# Patient Record
Sex: MALE | Race: WHITE | NOT HISPANIC OR LATINO | Employment: FULL TIME | URBAN - NONMETROPOLITAN AREA
[De-identification: names, ages, dates, MRNs, and addresses within clinical notes are randomized per-mention and may not be internally consistent; named-entity substitution may affect disease eponyms.]

---

## 2018-02-24 ENCOUNTER — DOCUMENTATION ONLY (OUTPATIENT)
Dept: FAMILY MEDICINE | Facility: OTHER | Age: 38
End: 2018-02-24

## 2018-02-24 RX ORDER — DIPHENOXYLATE HYDROCHLORIDE AND ATROPINE SULFATE 2.5; .025 MG/1; MG/1
1 TABLET ORAL DAILY
COMMUNITY

## 2019-02-26 ENCOUNTER — OFFICE VISIT (OUTPATIENT)
Dept: FAMILY MEDICINE | Facility: OTHER | Age: 39
End: 2019-02-26
Attending: NURSE PRACTITIONER
Payer: COMMERCIAL

## 2019-02-26 VITALS
DIASTOLIC BLOOD PRESSURE: 70 MMHG | RESPIRATION RATE: 18 BRPM | TEMPERATURE: 98.4 F | SYSTOLIC BLOOD PRESSURE: 120 MMHG | HEART RATE: 78 BPM | OXYGEN SATURATION: 96 % | WEIGHT: 157.5 LBS | HEIGHT: 66 IN | BODY MASS INDEX: 25.31 KG/M2

## 2019-02-26 DIAGNOSIS — J06.9 VIRAL URI WITH COUGH: Primary | ICD-10-CM

## 2019-02-26 PROCEDURE — 99213 OFFICE O/P EST LOW 20 MIN: CPT | Performed by: NURSE PRACTITIONER

## 2019-02-26 SDOH — HEALTH STABILITY: MENTAL HEALTH: HOW OFTEN DO YOU HAVE A DRINK CONTAINING ALCOHOL?: NEVER

## 2019-02-26 SDOH — HEALTH STABILITY: MENTAL HEALTH: HOW OFTEN DO YOU HAVE 6 OR MORE DRINKS ON ONE OCCASION?: LESS THAN MONTHLY

## 2019-02-26 ASSESSMENT — PAIN SCALES - GENERAL: PAINLEVEL: NO PAIN (0)

## 2019-02-26 ASSESSMENT — MIFFLIN-ST. JEOR: SCORE: 1573.2

## 2019-02-26 NOTE — PATIENT INSTRUCTIONS
Symptomatic treatments recommended.  -Discussed that antibiotics would not help symptoms of viral URI. Education provided on symptoms of secondary bacterial infection such as new fever, chills, rigors, shortness of breath, increased work of breathing, that can occur with viral URI and need for further evaluation, if they occur.   - Ensure you are staying hydrated by drinking plenty of fluids or eating foods such as popsicles, jello, pudding.  - Honey and Salt water gurgles can help soothe sore throat  - Rest  - Humidifier can help with congestion and help keep mucus membranes such as throat and nose from drying out.  - Sleeping slightly propped up can help with congestion and postnasal drainage that can worsen cough at bedtime.  - As long as you have never been told to take Tylenol and/or Ibuprofen you can use them to manage fever and body aches per package instructions  Make sure you eat when you take ibuprofen to avoid stomach upset.  - OTC cough medications per package instructions to help with cough. Check to see if the cough/cold medication already has acetaminophen (Tylenol) in it. If it does avoid taking additional Tylenol.  - If sudden onset of new fever, worsening symptoms return for further evaluation.  - OTC antihistamine such as Allegra, Zyrtec, Claritin (generic is okay) can help with nasal/sinus congestion and OTC nasal steroid such as Flonase can help decrease sinus inflammation to help with congestion.

## 2019-02-26 NOTE — NURSING NOTE
"Chief Complaint   Patient presents with     Flu Symptoms     body aches, weakness, fever, x 6 days, rash started this morning.     Started 6 days ago, fever has been down since the last 2 days.    Initial /70   Pulse 78   Temp 98.4  F (36.9  C) (Temporal)   Resp 18   Ht 1.67 m (5' 5.75\")   Wt 71.4 kg (157 lb 8 oz)   SpO2 96%   BMI 25.61 kg/m   Estimated body mass index is 25.61 kg/m  as calculated from the following:    Height as of this encounter: 1.67 m (5' 5.75\").    Weight as of this encounter: 71.4 kg (157 lb 8 oz).    Medication Reconciliation: complete      Norma J. Gosselin, LPN  "

## 2019-02-26 NOTE — PROGRESS NOTES
SUBJECTIVE:   Rufino Fragoso is a 38 year old male who presents to clinic today for the following health issues:    Acute Illness   Acute illness concerns: flu like symptoms  Onset: 6 days    Fever: YES- up to 102F, none for the last 2 days    Chills/Sweats: YES- initially, none for the last 2 days    Headache (location?): YES- frontal    Sinus Pressure:no    Conjunctivitis:  no    Ear Pain: no    Rhinorrhea: YES- mild    Congestion: no    Sore Throat: YES- scrathcy     Cough: YES-non-productive    Wheeze: no    Decreased Appetite: YES    Nausea: no    Vomiting: no    Diarrhea:  no    Dysuria/Freq.: no    Fatigue/Achiness: YES    Sick/Strep Exposure: no     Therapies Tried and outcome: Tylenol cold and flu extreme - slightly better    Problem list and histories reviewed & adjusted, as indicated.  Additional history: as documented    There is no problem list on file for this patient.    Past Surgical History:   Procedure Laterality Date     EXCISE MASS GROIN Left 2000       Social History     Tobacco Use     Smoking status: Never Smoker     Smokeless tobacco: Never Used   Substance Use Topics     Alcohol use: Yes     Frequency: Never     Binge frequency: Less than monthly     Comment: Alcoholic Drinks/day: rare     Family History   Problem Relation Age of Onset     Colorectal Cancer Mother      Hypertension Maternal Half-Brother      Hypertension Maternal Half-Brother          Current Outpatient Medications   Medication Sig Dispense Refill     Multiple Vitamin (MULTI-VITAMINS) TABS Take 1 tablet by mouth daily       Allergies   Allergen Reactions     Codeine Nausea and Vomiting     Hydromorphone Nausea and Vomiting       Reviewed and updated as needed this visit by clinical staff  Tobacco  Allergies  Meds  Problems  Med Hx  Surg Hx  Fam Hx  Soc Hx        Reviewed and updated as needed this visit by Provider  Tobacco  Allergies  Meds  Problems  Med Hx  Surg Hx  Fam Hx  Soc Hx          ROS:  As  "above    OBJECTIVE:     /70   Pulse 78   Temp 98.4  F (36.9  C) (Temporal)   Resp 18   Ht 1.67 m (5' 5.75\")   Wt 71.4 kg (157 lb 8 oz)   SpO2 96%   BMI 25.61 kg/m    Body mass index is 25.61 kg/m .  GENERAL: healthy, alert and no distress  EYES: Eyes grossly normal to inspection, PERRL and conjunctivae and sclerae normal  HENT: normal cephalic/atraumatic, ear canals and TM's normal, nose and mouth without ulcers or lesions, rhinorrhea clear, oral mucous membranes moist, sinuses: not tender and mild cobblestoning of posterior oropharynx  NECK: no adenopathy, no asymmetry, masses, or scars and thyroid normal to palpation  RESP: lungs clear to auscultation - no rales, rhonchi or wheezes  CV: regular rate and rhythm, normal S1 S2, no S3 or S4, no murmur, click or rub, no peripheral edema and peripheral pulses strong  MS: no gross musculoskeletal defects noted, no edema  SKIN: generalized pinpoint erythematous rash noted to both hands  NEURO: Normal strength and tone, mentation intact and speech normal  PSYCH: mentation appears normal, affect normal/bright    Diagnostic Test Results:  none     ASSESSMENT/PLAN:     1. Viral URI with cough  Symptomatic treatments recommended.  -Discussed that antibiotics would not help symptoms of viral URI. Education provided on symptoms of secondary bacterial infection such as new fever, chills, rigors, shortness of breath, increased work of breathing, that can occur with viral URI and need for further evaluation, if they occur.   - Ensure you are staying hydrated by drinking plenty of fluids or eating foods such as popsicles, jello, pudding.  - Honey and Salt water gurgles can help soothe sore throat  - Rest  - Humidifier can help with congestion and help keep mucus membranes such as throat and nose from drying out.  - Sleeping slightly propped up can help with congestion and postnasal drainage that can worsen cough at bedtime.  - As long as you have never been told to take " Tylenol and/or Ibuprofen you can use them to manage fever and body aches per package instructions  Make sure you eat when you take ibuprofen to avoid stomach upset.  - OTC cough medications per package instructions to help with cough. Check to see if the cough/cold medication already has acetaminophen (Tylenol) in it. If it does avoid taking additional Tylenol.  - If sudden onset of new fever, worsening symptoms return for further evaluation.  - OTC antihistamine such as Allegra, Zyrtec, Claritin (generic is okay) can help with nasal/sinus congestion and OTC nasal steroid such as Flonase can help decrease sinus inflammation to help with congestion.    Daksha Pedersen NP  [facility]

## 2023-03-14 ENCOUNTER — HOSPITAL ENCOUNTER (OUTPATIENT)
Dept: CARDIOLOGY | Facility: OTHER | Age: 43
Discharge: HOME | End: 2023-03-14
Attending: NURSE PRACTITIONER | Admitting: NURSE PRACTITIONER
Payer: COMMERCIAL

## 2023-03-14 DIAGNOSIS — R00.2 PALPITATIONS: ICD-10-CM

## 2023-03-14 LAB — LVEF ECHO: NORMAL

## 2023-03-14 PROCEDURE — 93306 TTE W/DOPPLER COMPLETE: CPT

## 2023-03-14 PROCEDURE — 93306 TTE W/DOPPLER COMPLETE: CPT | Mod: 26 | Performed by: STUDENT IN AN ORGANIZED HEALTH CARE EDUCATION/TRAINING PROGRAM

## 2023-11-01 ENCOUNTER — HOSPITAL ENCOUNTER (EMERGENCY)
Facility: OTHER | Age: 43
Discharge: HOME | End: 2023-11-01
Attending: EMERGENCY MEDICINE | Admitting: EMERGENCY MEDICINE
Payer: COMMERCIAL

## 2023-11-01 VITALS
TEMPERATURE: 97.7 F | OXYGEN SATURATION: 98 % | WEIGHT: 168 LBS | HEART RATE: 76 BPM | RESPIRATION RATE: 23 BRPM | SYSTOLIC BLOOD PRESSURE: 116 MMHG | HEIGHT: 68 IN | DIASTOLIC BLOOD PRESSURE: 77 MMHG | BODY MASS INDEX: 25.46 KG/M2

## 2023-11-01 DIAGNOSIS — R55 PRE-SYNCOPE: ICD-10-CM

## 2023-11-01 DIAGNOSIS — R42 DIZZINESS: ICD-10-CM

## 2023-11-01 LAB
ANION GAP SERPL CALCULATED.3IONS-SCNC: 10 MMOL/L (ref 7–15)
BASOPHILS # BLD AUTO: 0.1 10E3/UL (ref 0–0.2)
BASOPHILS NFR BLD AUTO: 1 %
BUN SERPL-MCNC: 20 MG/DL (ref 6–20)
CALCIUM SERPL-MCNC: 9.4 MG/DL (ref 8.6–10)
CHLORIDE SERPL-SCNC: 103 MMOL/L (ref 98–107)
CREAT SERPL-MCNC: 1.18 MG/DL (ref 0.67–1.17)
DEPRECATED HCO3 PLAS-SCNC: 27 MMOL/L (ref 22–29)
EGFRCR SERPLBLD CKD-EPI 2021: 79 ML/MIN/1.73M2
EOSINOPHIL # BLD AUTO: 0.2 10E3/UL (ref 0–0.7)
EOSINOPHIL NFR BLD AUTO: 2 %
ERYTHROCYTE [DISTWIDTH] IN BLOOD BY AUTOMATED COUNT: 11.9 % (ref 10–15)
FLUAV RNA SPEC QL NAA+PROBE: NEGATIVE
FLUBV RNA RESP QL NAA+PROBE: NEGATIVE
GLUCOSE SERPL-MCNC: 128 MG/DL (ref 70–99)
HCT VFR BLD AUTO: 45.2 % (ref 40–53)
HGB BLD-MCNC: 15.6 G/DL (ref 13.3–17.7)
HOLD SPECIMEN: NORMAL
IMM GRANULOCYTES # BLD: 0 10E3/UL
IMM GRANULOCYTES NFR BLD: 0 %
LACTATE SERPL-SCNC: 1.6 MMOL/L (ref 0.7–2)
LYMPHOCYTES # BLD AUTO: 2.5 10E3/UL (ref 0.8–5.3)
LYMPHOCYTES NFR BLD AUTO: 26 %
MAGNESIUM SERPL-MCNC: 2 MG/DL (ref 1.7–2.3)
MCH RBC QN AUTO: 30.4 PG (ref 26.5–33)
MCHC RBC AUTO-ENTMCNC: 34.5 G/DL (ref 31.5–36.5)
MCV RBC AUTO: 88 FL (ref 78–100)
MONOCYTES # BLD AUTO: 0.9 10E3/UL (ref 0–1.3)
MONOCYTES NFR BLD AUTO: 9 %
NEUTROPHILS # BLD AUTO: 6 10E3/UL (ref 1.6–8.3)
NEUTROPHILS NFR BLD AUTO: 62 %
NRBC # BLD AUTO: 0 10E3/UL
NRBC BLD AUTO-RTO: 0 /100
PLATELET # BLD AUTO: 220 10E3/UL (ref 150–450)
POTASSIUM SERPL-SCNC: 4 MMOL/L (ref 3.4–5.3)
RBC # BLD AUTO: 5.13 10E6/UL (ref 4.4–5.9)
RSV RNA SPEC NAA+PROBE: NEGATIVE
SARS-COV-2 RNA RESP QL NAA+PROBE: NEGATIVE
SODIUM SERPL-SCNC: 140 MMOL/L (ref 135–145)
TROPONIN T SERPL HS-MCNC: <6 NG/L
WBC # BLD AUTO: 9.6 10E3/UL (ref 4–11)

## 2023-11-01 PROCEDURE — C9803 HOPD COVID-19 SPEC COLLECT: HCPCS | Performed by: EMERGENCY MEDICINE

## 2023-11-01 PROCEDURE — 84484 ASSAY OF TROPONIN QUANT: CPT | Performed by: EMERGENCY MEDICINE

## 2023-11-01 PROCEDURE — 83605 ASSAY OF LACTIC ACID: CPT | Performed by: EMERGENCY MEDICINE

## 2023-11-01 PROCEDURE — 93005 ELECTROCARDIOGRAM TRACING: CPT | Performed by: EMERGENCY MEDICINE

## 2023-11-01 PROCEDURE — 80048 BASIC METABOLIC PNL TOTAL CA: CPT | Performed by: EMERGENCY MEDICINE

## 2023-11-01 PROCEDURE — 36415 COLL VENOUS BLD VENIPUNCTURE: CPT | Performed by: EMERGENCY MEDICINE

## 2023-11-01 PROCEDURE — 99284 EMERGENCY DEPT VISIT MOD MDM: CPT | Performed by: EMERGENCY MEDICINE

## 2023-11-01 PROCEDURE — 85025 COMPLETE CBC W/AUTO DIFF WBC: CPT | Performed by: EMERGENCY MEDICINE

## 2023-11-01 PROCEDURE — 83735 ASSAY OF MAGNESIUM: CPT | Performed by: EMERGENCY MEDICINE

## 2023-11-01 PROCEDURE — 87637 SARSCOV2&INF A&B&RSV AMP PRB: CPT | Performed by: EMERGENCY MEDICINE

## 2023-11-01 PROCEDURE — 93010 ELECTROCARDIOGRAM REPORT: CPT | Performed by: INTERNAL MEDICINE

## 2023-11-01 ASSESSMENT — ACTIVITIES OF DAILY LIVING (ADL): ADLS_ACUITY_SCORE: 35

## 2023-11-01 NOTE — ED PROVIDER NOTES
Emergency Department Provider Note  : 1980 Age: 43 year old Sex: male MRN: [id number]    Chief Complaint   Patient presents with    Dizziness       Medical Decision Making / Assessment / Plan   43 year old male presenting with presyncopal symptoms in the setting of chronic palpitations.  EKG done on arrival shows frequent PVCs but no signs of ischemia, no other dysrhythmias such as Brugada, WPW or prolonged QT. His symptoms are consistent with micturition syncope although he did not actually pass out from it.  I suggested this might be a possibility but also explained I do not have a test for that and that it is really a diagnosis of exclusion.  Plan to check CBC, lactate, troponin, chemistry.    ED Course as of 236      035 Patient's labs all returned unremarkable.  I had another discussion with him and his wife and explained all the things that we checked.  We do not have exact answer for his symptoms tonight but I think he is safe to discharge and follow-up with cardiology as previously arranged.         Final diagnoses:   Pre-syncope   Dizziness       Donnie Dickerson MD  2023   Emergency Department    Osei Joy is a 43 year old male who presents with his wife who provides additional history.  He presents today with both a subacute and acute issue.  Patient has had an ongoing issue with palpitations since early .  He has had a normal echo in 2023.  He is scheduled to have a Holter monitor soon.  Yesterday he felt like he was coming down with a cold and had quite a few more palpitations when he was at work. He has had more stress recently as his mother   but yesterday was nothing particularly stressful at work.  He was eating and drinking normally.     His more acute problem is that he got up to go the bathroom shortly prior to arrival.  While he was standing in the bathroom urinating, he felt like he was going to pass out.  He became very  "dizzy.  He was able to walk back to bed and just sort of leaned against his bed and the wall.  He was diaphoretic and he and his wife thought he had a very faint pulse.  She reports that his color was very bad, kind of a yellowish color.  He was able to get dressed and but felt he was going to pass out and had tunnel vision.  Currently he reports that he feels palpitations but otherwise feeling sort of back to normal.    Denies alcohol, drugs, smoking; drinks 2 to 3 cups of coffee per day.    I have reviewed the Medications, Allergies, Past Medical and Surgical History, and Social History in the Epic System.    Objective   BP: 134/68  Pulse: 77  Temp: 97.7  F (36.5  C)  Resp: 19  Height: 172.7 cm (5' 8\")  Weight: 76.2 kg (168 lb)  SpO2: 95 %    Physical Exam:   General: awake and alert  Eyes: conjugate gaze  ENT: moist mucous membranes  Chest/Respiratory: normal resp effort  Cardiovascular: warm and well perfused  Extremities: mobility at baseline  Neurological: moving all extremities, normal speech, gait at baseline  Skin: warm and dry  Psychiatric: appropriate affect        Medical/Surgical History:  No past medical history on file.  Past Surgical History:   Procedure Laterality Date    EXCISE MASS GROIN Left 2000       Medications:  No current facility-administered medications for this encounter.     Current Outpatient Medications   Medication    Multiple Vitamin (MULTI-VITAMINS) TABS       Allergies:  Codeine and Hydromorphone    Relevant labs, images, EKGs, Epic and outside hospital (if applicable) charts were reviewed. The findings, diagnosis, plan, and need for follow up were discussed with the patient/family. Nursing notes were reviewed.        Donnie Dickerson MD  11/01/23 0356    "

## 2023-11-01 NOTE — ED TRIAGE NOTES
"Pt comes into the ER today via private car with wife. Pt reports that approx a half hour prior to arrival pt had gotten up to go to the bathroom, was urinating, felt faint, dizzy, sweaty and wife said pale. He was nauseated. No chest pain. Cold, clammy. \"Pins in his head\". Reports sore throat and runny nose since Monday. Palpitations yesterday increased, has been dealing with that since the spring.   Denies fever, n/v/d, chest pain, sob.   Recently lost his mother earlier last month.      Triage Assessment (Adult)       Row Name 11/01/23 0302          Triage Assessment    Airway WDL WDL        Respiratory WDL    Respiratory WDL WDL        Skin Circulation/Temperature WDL    Skin Circulation/Temperature WDL X  sweaty        Cardiac WDL    Cardiac WDL X  palpitations        Peripheral/Neurovascular WDL    Peripheral Neurovascular WDL WDL        Cognitive/Neuro/Behavioral WDL    Cognitive/Neuro/Behavioral WDL WDL        [place] Coma Scale    Best Eye Response 4-->(E4) spontaneous     Best Motor Response 6-->(M6) obeys commands     Best Verbal Response 5-->(V5) oriented     [place] Coma Scale Score 15                     "

## 2023-11-02 LAB
ATRIAL RATE - MUSE: 76 BPM
DIASTOLIC BLOOD PRESSURE - MUSE: NORMAL MMHG
INTERPRETATION ECG - MUSE: NORMAL
P AXIS - MUSE: 47 DEGREES
PR INTERVAL - MUSE: 146 MS
QRS DURATION - MUSE: 106 MS
QT - MUSE: 380 MS
QTC - MUSE: 427 MS
R AXIS - MUSE: 61 DEGREES
SYSTOLIC BLOOD PRESSURE - MUSE: NORMAL MMHG
T AXIS - MUSE: 35 DEGREES
VENTRICULAR RATE- MUSE: 76 BPM

## 2024-09-25 ENCOUNTER — APPOINTMENT (OUTPATIENT)
Dept: GENERAL RADIOLOGY | Facility: OTHER | Age: 44
End: 2024-09-25
Payer: COMMERCIAL

## 2024-09-25 ENCOUNTER — HOSPITAL ENCOUNTER (EMERGENCY)
Facility: OTHER | Age: 44
Discharge: HOME | End: 2024-09-25
Payer: COMMERCIAL

## 2024-09-25 VITALS
WEIGHT: 168 LBS | HEIGHT: 68 IN | HEART RATE: 84 BPM | TEMPERATURE: 97.8 F | SYSTOLIC BLOOD PRESSURE: 116 MMHG | BODY MASS INDEX: 25.46 KG/M2 | RESPIRATION RATE: 14 BRPM | DIASTOLIC BLOOD PRESSURE: 75 MMHG | OXYGEN SATURATION: 95 %

## 2024-09-25 DIAGNOSIS — R00.2 PALPITATIONS: ICD-10-CM

## 2024-09-25 DIAGNOSIS — R07.9 CHEST PAIN, UNSPECIFIED TYPE: ICD-10-CM

## 2024-09-25 LAB
ALBUMIN SERPL BCG-MCNC: 4.8 G/DL (ref 3.5–5.2)
ALP SERPL-CCNC: 64 U/L (ref 40–150)
ALT SERPL W P-5'-P-CCNC: 37 U/L (ref 0–70)
ANION GAP SERPL CALCULATED.3IONS-SCNC: 12 MMOL/L (ref 7–15)
APTT PPP: 30 SECONDS (ref 22–38)
AST SERPL W P-5'-P-CCNC: 27 U/L (ref 0–45)
BASOPHILS # BLD AUTO: 0.1 10E3/UL (ref 0–0.2)
BASOPHILS NFR BLD AUTO: 1 %
BILIRUB SERPL-MCNC: 0.6 MG/DL
BUN SERPL-MCNC: 16.6 MG/DL (ref 6–20)
CALCIUM SERPL-MCNC: 9.3 MG/DL (ref 8.8–10.4)
CHLORIDE SERPL-SCNC: 100 MMOL/L (ref 98–107)
CREAT SERPL-MCNC: 1.15 MG/DL (ref 0.67–1.17)
D DIMER PPP FEU-MCNC: <=0.27 UG/ML FEU (ref 0–0.5)
EGFRCR SERPLBLD CKD-EPI 2021: 80 ML/MIN/1.73M2
EOSINOPHIL # BLD AUTO: 0.1 10E3/UL (ref 0–0.7)
EOSINOPHIL NFR BLD AUTO: 1 %
ERYTHROCYTE [DISTWIDTH] IN BLOOD BY AUTOMATED COUNT: 11.9 % (ref 10–15)
GLUCOSE SERPL-MCNC: 99 MG/DL (ref 70–99)
HCO3 SERPL-SCNC: 26 MMOL/L (ref 22–29)
HCT VFR BLD AUTO: 44.6 % (ref 40–53)
HGB BLD-MCNC: 15.6 G/DL (ref 13.3–17.7)
HOLD SPECIMEN: NORMAL
IMM GRANULOCYTES # BLD: 0 10E3/UL
IMM GRANULOCYTES NFR BLD: 0 %
INR PPP: 0.93 (ref 0.85–1.15)
LYMPHOCYTES # BLD AUTO: 3 10E3/UL (ref 0.8–5.3)
LYMPHOCYTES NFR BLD AUTO: 37 %
MAGNESIUM SERPL-MCNC: 2.2 MG/DL (ref 1.7–2.3)
MCH RBC QN AUTO: 30.8 PG (ref 26.5–33)
MCHC RBC AUTO-ENTMCNC: 35 G/DL (ref 31.5–36.5)
MCV RBC AUTO: 88 FL (ref 78–100)
MONOCYTES # BLD AUTO: 0.6 10E3/UL (ref 0–1.3)
MONOCYTES NFR BLD AUTO: 8 %
NEUTROPHILS # BLD AUTO: 4.3 10E3/UL (ref 1.6–8.3)
NEUTROPHILS NFR BLD AUTO: 53 %
NRBC # BLD AUTO: 0 10E3/UL
NRBC BLD AUTO-RTO: 0 /100
NT-PROBNP SERPL-MCNC: 39 PG/ML (ref 0–450)
PLATELET # BLD AUTO: 220 10E3/UL (ref 150–450)
POTASSIUM SERPL-SCNC: 3.9 MMOL/L (ref 3.4–5.3)
PROT SERPL-MCNC: 7.4 G/DL (ref 6.4–8.3)
RBC # BLD AUTO: 5.07 10E6/UL (ref 4.4–5.9)
SODIUM SERPL-SCNC: 138 MMOL/L (ref 135–145)
TROPONIN T SERPL HS-MCNC: 8 NG/L
TROPONIN T SERPL HS-MCNC: <6 NG/L
TSH SERPL DL<=0.005 MIU/L-ACNC: 0.95 UIU/ML (ref 0.3–4.2)
WBC # BLD AUTO: 8.1 10E3/UL (ref 4–11)

## 2024-09-25 PROCEDURE — 36415 COLL VENOUS BLD VENIPUNCTURE: CPT

## 2024-09-25 PROCEDURE — 71045 X-RAY EXAM CHEST 1 VIEW: CPT

## 2024-09-25 PROCEDURE — 84484 ASSAY OF TROPONIN QUANT: CPT

## 2024-09-25 PROCEDURE — 99284 EMERGENCY DEPT VISIT MOD MDM: CPT

## 2024-09-25 PROCEDURE — 93005 ELECTROCARDIOGRAM TRACING: CPT

## 2024-09-25 PROCEDURE — 85379 FIBRIN DEGRADATION QUANT: CPT

## 2024-09-25 PROCEDURE — 85004 AUTOMATED DIFF WBC COUNT: CPT

## 2024-09-25 PROCEDURE — 84443 ASSAY THYROID STIM HORMONE: CPT

## 2024-09-25 PROCEDURE — 93010 ELECTROCARDIOGRAM REPORT: CPT | Performed by: STUDENT IN AN ORGANIZED HEALTH CARE EDUCATION/TRAINING PROGRAM

## 2024-09-25 PROCEDURE — 93242 EXT ECG>48HR<7D RECORDING: CPT

## 2024-09-25 PROCEDURE — 250N000013 HC RX MED GY IP 250 OP 250 PS 637

## 2024-09-25 PROCEDURE — 83735 ASSAY OF MAGNESIUM: CPT

## 2024-09-25 PROCEDURE — 82040 ASSAY OF SERUM ALBUMIN: CPT

## 2024-09-25 PROCEDURE — 99285 EMERGENCY DEPT VISIT HI MDM: CPT | Mod: 25

## 2024-09-25 PROCEDURE — 85730 THROMBOPLASTIN TIME PARTIAL: CPT

## 2024-09-25 PROCEDURE — 83880 ASSAY OF NATRIURETIC PEPTIDE: CPT

## 2024-09-25 PROCEDURE — 85610 PROTHROMBIN TIME: CPT

## 2024-09-25 RX ORDER — ASPIRIN 81 MG/1
324 TABLET, CHEWABLE ORAL ONCE
Status: COMPLETED | OUTPATIENT
Start: 2024-09-25 | End: 2024-09-25

## 2024-09-25 RX ADMIN — ASPIRIN 81 MG CHEWABLE TABLET 324 MG: 81 TABLET CHEWABLE at 16:35

## 2024-09-25 ASSESSMENT — ACTIVITIES OF DAILY LIVING (ADL)
ADLS_ACUITY_SCORE: 35

## 2024-09-25 ASSESSMENT — COLUMBIA-SUICIDE SEVERITY RATING SCALE - C-SSRS
2. HAVE YOU ACTUALLY HAD ANY THOUGHTS OF KILLING YOURSELF IN THE PAST MONTH?: NO
1. IN THE PAST MONTH, HAVE YOU WISHED YOU WERE DEAD OR WISHED YOU COULD GO TO SLEEP AND NOT WAKE UP?: NO
6. HAVE YOU EVER DONE ANYTHING, STARTED TO DO ANYTHING, OR PREPARED TO DO ANYTHING TO END YOUR LIFE?: NO

## 2024-09-25 NOTE — ED NOTES
[facility] called this ER to report that they were sending pt over, pt arrived to clinic for a physical, but had been complaining of lt sided chest achiness and palpitations, they report that pt is in bigeminy per their EKG

## 2024-09-25 NOTE — ED PROVIDER NOTES
"  History     Chief Complaint   Patient presents with    Palpitations     HPI  Rufino Fragoso is a 44 year old male with palpitations, fatigue, and chest pressure. Hx palpitations for the past 1.5 years. He reports full work up with cardiology in the past. Unclear what is causing his palpitations. Over the past 2 weeks he has been feeling the palpitations more and feeling fatigued. Today at approximately 0520 he developed chest pressure to the left side of his chest and a \"feeling\" in his left arm pit. Today he was at the clinic for a physical and mentioned the palpitations. He was sent over from the clinic. He would not rate the chest pressure. He describes it more as of a sensation in his chest. He does use caffeine daily. He will usually have 1 small cup prior to work and then drinks 2 more cups in the morning while at work. He will occasionally have a bubblr drink during the week.     Copied and pasted cardiology visit (1/5/24)   Imaging/ Results Review:   Stress Echo: 12/2023  Interpretation Summary   (1) 43M with frequent PVCs.   (2) Exercise stress echocardiogram negative for inducible myocardial ischemia.   (3) Baseline TTE showed normal LV systolic function and wall motion. Resting LVEF 65%. With stress, global myocardial contractility improved. No regional wall   motion abnormalities. LVEF increased to 75% with stress.   (4) Good exercise tolerance (13.6 METs). Normal HR and blood pressure responses to stress. Exercise was limited by fatigue.   (5) Rest ECG showed normal sinus rhythm with normal ST/T wave segments and frequent monomorphic PVCs (LBBB, + in II/III/AVF). No pathologic ST/T wave   abnormalities with stress. PVCs resolved with exercise.   (6) Limited resting echocardiogram was unremarkable. See details below.     Left Ventricle   The left ventricular systolic function is normal. Qualitative ejection fraction is 65-70% (normal). The left ventricle is normal size. There is normal left "   ventricular wall thickness. Wall motion is normal.     Right Ventricle   Normal right ventricular systolic function. The right ventricular cavity size is qualitatively normal.     Atria   Left atrium is normal in size by volume. Right atrium is qualitatively normal in size.     Aortic Valve   The aortic valve is tricuspid without significant stenosis or insufficiency.     Mitral Valve   The mitral valve anatomy is normal and there is no significant mitral regurgitation or stenosis.     Tricuspid Valve   The tricuspid valve anatomy is normal and there is no significant tricuspid regurgitation or stenosis.     Pulmonic Valve   Pulmonic valve not well visualized.     Pericardium/Pleura   There is no pericardial effusion.     Vessels   The aortic root is normal in size for body surface area. Proximal ascending aorta is not well visualized. The pulmonary is not well visualized.     Hemodynamics   TR signal inadequate to allow accurate estimate RV systolic pressure.     Stress Findings   Exercise was performed employing Sukhwinder protocol. Exercise was terminated after 12:30. Resting blood pressure and heart rate were 142/73 and 75 bpm. Maximum   achieved blood pressure and heart rate were 196/80 and 176 bpm. Patient achieved rate pressure product of 34,496. Target heart rate was achieved. 13.6 METS of   estimated workload was achieved. 119% of untrained functional aerobic capacity was reached. The patient had normal blood pressure response to exercise. The   patient exhibited no chest pain during exercise. Symptom limited end point: dyspnea and fatigue. Patient reports palpitations in recovery consistent with PVCs.   The Duke treadmill score is +12.5. The rhythm was normal sinus rhythm. Frequent PVCs and couplets noted at rest. Ectopy decreases at higher heart rates. No   diagnostic ST changes were seen with exercise stress. The stress test was supervised by Ariella Mckinney. The stress test was performed by Cardiology  technician   Kal Yanez. The supervising cardiologist for this stress test was Joyce Calle MD.     Stress Echo   Stress echo is negative for inducible ischemia. There was normal augmentation of all left ventricular wall segments post exercise.     Stress Results                                                                                 Maximum Predicted HR:   177 bpm                                                       Target HR: 150 bpm        % Maximum Predicted HR: 99 %                                                                                 DurationHeart Rate                                                                    Stage      (mm:ss)   (bpm)     BP                                                               Pretest standing            83    146/82                                                               Exercise stage 1  3:00     108    158/72                                                               Exercise stage 2  3:00     127    172/74                                                               Exercise stage 3  3:00     162    184/74                                                               Exercise stage 4  3:00     173    196/80                                                               Exercise stage 5  0:30     176      /                                                                  Recovery 1              129      /                                                                  Recovery 2              118    160/78                                                                  Recovery 4              113    140/72                                                                 Recovery 6R              110    120/78                                                                  Stress Duration:   12:30 mm:ss                                                                Maximum Stress HR: 176 bpm     MMode/2D Measurements & Calculations   Ao  root diam: 2.9 cm                                                            EDV(MOD-sp4): 91.4 ml                                                                                   EDV(MOD-sp2): 91.1 ml                                                                                   EDV(MOD-bp): 92.7 ml                                                                                   EDV(sp4-el): 95.2 ml                                                                                   ESV(MOD-sp4): 31.5 ml                                                                                   EF(MOD-sp4): 65.5 %                   _________________________________________________________________________________________________________________________________   ESV(MOD-bp): 31.5 ml                                                            SV(MOD-bp): 61.2 ml   EF(MOD-bp): 66.1 %   EDV(sp2-el): 91.4 ml   ESV(MOD-sp2): 29.4 ml   EF(MOD-sp2): 67.7 %     Holter Monitor 11/2023  Summary:  The patient's monitoring period was 7 days.  Indication: (R00.2) Palpitations     [employer] report personally reviewed including each rhythm strip.      GENERAL RHYTHM INFORMATION: Holter monitor was performed in 2 leads for 6 days and 7 hours      Conclusion:     1. Monitor showed predominately: normal sinus rhythm with an average heart rate of 85 bpm and range of 48 to 165 bpm in sinus.  2. Tachycardia: 12 % of the time.  3. Bradycardia: <1 % of the time.  4. Ventricular ectopy: frequent (>5%) with 7 % PVC present. 1357 NSVT runs., The longest is 5 beats at 155 bpm and The fastest is 3 beats at 238 bpm  5. Supraventricular ectopy: rare (<1%) PAC present. No runs.  6. Atrial fibrillation: none  7. Significant pauses: none  8. Patient symptoms of: There were 7 patient triggered events. Symptoms of rapid/fast heartbeat; flutter/skipped beats were associated with sinus rhythm and PVC, ventricular couplet. 6 events - 'no symptoms specified'  were associated with sinus rhythm/sinus tachycardia with isolated PVCs, ventricular couplet and NSVT.     Holter Monitor 3/2023  GENERAL RHYTHM INFORMATION: Holter monitor was performed in 2 leads for 5 Days 18 Hours 31 mins      Conclusion:     9. Monitor showed predominately: normal sinus rhythm, intact AV conduction with an average heart rate of 80 bpm and range of 51 bpm to 186 bpm.  10. Tachycardia: 9.36% of the time.  11. Bradycardia: 0.33% of the time.  12. Ventricular ectopy: rare (<1%) PVC present. 6 runs. Longest run was 4 beats long.  13. Supraventricular ectopy: rare (<1%) PAC present. No runs.  14. Atrial fibrillation: none  15. Significant pauses: none  16. Patient symptoms of: 14 patient events were associated with normal sinus rhythm, normal sinus rhythm with artifact, sinus tachycardia. 11 patient events were associated with sinus rhythm with PVCs. 1 patient episode associated with long run of asystole, consistent with lead loss although recommend clinical correlation (3/17/2023 at 2 PM)    Plan:   (R00.2) Palpitations (primary encounter diagnosis), (I49.3) Frequent PVCs  Comment: Had a long discussion about the potential causes and triggers of PVCs including sleep deprivation/apnea, caffeine use, alcohol use, illicit drugs, coronary disease, thyroid disease.   Plan: Patient wishes to work on lifestyle management at this time versus adding a low dose beta blocker. He is going to cut back on caffeine and alcohol use as well as try to get more consistent sleep.         Allergies  Allergies   Allergen Reactions    Codeine Nausea and Vomiting    Hydromorphone Nausea and Vomiting       Problem List:    There are no problems to display for this patient.       Past Medical History:    No past medical history on file.    Past Surgical History:    Past Surgical History:   Procedure Laterality Date    EXCISE MASS GROIN Left 2000       Family History:    Family History   Problem Relation Age of Onset     "Colorectal Cancer Mother     Hypertension Maternal Half-Brother     Hypertension Maternal Half-Brother        Social History:  Marital Status:   [2]  Social History     Tobacco Use    Smoking status: Never    Smokeless tobacco: Never   Substance Use Topics    Alcohol use: Yes     Comment: Alcoholic Drinks/day: rare    Drug use: No        Medications:    Multiple Vitamin (MULTI-VITAMINS) TABS        Review of Systems   Constitutional:  Positive for fatigue.   Cardiovascular:  Positive for chest pain and palpitations.   Psychiatric/Behavioral:  The patient is nervous/anxious.        Physical Exam   BP: (!) 145/97  Pulse: 78  Temp: 97.8  F (36.6  C)  Resp: 16  Height: 172.7 cm (5' 8\")  Weight: 76.2 kg (168 lb)  SpO2: 96 %      Physical Exam  Vitals and nursing note reviewed.   Constitutional:       Appearance: Normal appearance. He is not ill-appearing.   HENT:      Head: Normocephalic.      Right Ear: Tympanic membrane normal.      Left Ear: Tympanic membrane normal.      Nose: Nose normal.      Mouth/Throat:      Mouth: Mucous membranes are moist.   Eyes:      Conjunctiva/sclera: Conjunctivae normal.   Cardiovascular:      Rate and Rhythm: Normal rate. Rhythm irregular.      Pulses: Normal pulses.      Heart sounds: No murmur heard.  Pulmonary:      Effort: Pulmonary effort is normal.      Breath sounds: Normal breath sounds.   Abdominal:      General: Bowel sounds are normal.      Palpations: Abdomen is soft.      Tenderness: There is no abdominal tenderness.   Musculoskeletal:         General: Normal range of motion.      Cervical back: Normal range of motion and neck supple.      Right lower leg: No edema.      Left lower leg: No edema.   Skin:     General: Skin is warm and dry.      Capillary Refill: Capillary refill takes less than 2 seconds.   Neurological:      General: No focal deficit present.      Mental Status: He is alert and oriented to person, place, and time. Mental status is at baseline. "   Psychiatric:         Mood and Affect: Mood normal.            Results for orders placed or performed during the hospital encounter of 09/25/24 (from the past 24 hour(s))   [place] Draw    Narrative    The following orders were created for panel order [place] Draw.  Procedure                               Abnormality         Status                     ---------                               -----------         ------                     Extra Blue Top Tube[[id number]]                              Final result               Extra Red Top Tube[[id number]]                               Final result               Extra Green Top (Lithium...[[id number]]                      Final result               Extra Purple Top Tube[[id number]]                            Final result               Extra Green Top (Lithium...[[id number]]                      Final result                 Please view results for these tests on the individual orders.   Extra Blue Top Tube   Result Value Ref Range    Hold Specimen hold    Extra Red Top Tube   Result Value Ref Range    Hold Specimen JIC    Extra Green Top (Lithium Heparin) Tube   Result Value Ref Range    Hold Specimen JIC    Extra Purple Top Tube   Result Value Ref Range    Hold Specimen hold    Extra Green Top (Lithium Heparin) ON ICE   Result Value Ref Range    Hold Specimen JIC    CBC with platelets differential    Narrative    The following orders were created for panel order CBC with platelets differential.  Procedure                               Abnormality         Status                     ---------                               -----------         ------                     CBC with platelets and d...[[id number]]                      Final result                 Please view results for these tests on the individual orders.   Troponin T, High Sensitivity   Result Value Ref Range    Troponin T, High Sensitivity 8 <=22 ng/L   INR   Result Value Ref Range    INR 0.93 0.85 - 1.15    Partial thromboplastin time   Result Value Ref Range    aPTT 30 22 - 38 Seconds   Comprehensive metabolic panel   Result Value Ref Range    Sodium 138 135 - 145 mmol/L    Potassium 3.9 3.4 - 5.3 mmol/L    Carbon Dioxide (CO2) 26 22 - 29 mmol/L    Anion Gap 12 7 - 15 mmol/L    Urea Nitrogen 16.6 6.0 - 20.0 mg/dL    Creatinine 1.15 0.67 - 1.17 mg/dL    GFR Estimate 80 >60 mL/min/1.73m2    Calcium 9.3 8.8 - 10.4 mg/dL    Chloride 100 98 - 107 mmol/L    Glucose 99 70 - 99 mg/dL    Alkaline Phosphatase 64 40 - 150 U/L    AST 27 0 - 45 U/L    ALT 37 0 - 70 U/L    Protein Total 7.4 6.4 - 8.3 g/dL    Albumin 4.8 3.5 - 5.2 g/dL    Bilirubin Total 0.6 <=1.2 mg/dL   D dimer quantitative   Result Value Ref Range    D-Dimer Quantitative <=0.27 0.00 - 0.50 ug/mL FEU    Narrative    This D-dimer assay is intended for use in conjunction with a clinical pretest probability assessment model to exclude pulmonary embolism (PE) and deep venous thrombosis (DVT) in outpatients suspected of PE or DVT. The cut-off value is 0.50 ug/mL FEU.   Magnesium   Result Value Ref Range    Magnesium 2.2 1.7 - 2.3 mg/dL   Nt probnp inpatient (BNP)   Result Value Ref Range    N terminal Pro BNP Inpatient 39 0 - 450 pg/mL   CBC with platelets and differential   Result Value Ref Range    WBC Count 8.1 4.0 - 11.0 10e3/uL    RBC Count 5.07 4.40 - 5.90 10e6/uL    Hemoglobin 15.6 13.3 - 17.7 g/dL    Hematocrit 44.6 40.0 - 53.0 %    MCV 88 78 - 100 fL    MCH 30.8 26.5 - 33.0 pg    MCHC 35.0 31.5 - 36.5 g/dL    RDW 11.9 10.0 - 15.0 %    Platelet Count 220 150 - 450 10e3/uL    % Neutrophils 53 %    % Lymphocytes 37 %    % Monocytes 8 %    % Eosinophils 1 %    % Basophils 1 %    % Immature Granulocytes 0 %    NRBCs per 100 WBC 0 <1 /100    Absolute Neutrophils 4.3 1.6 - 8.3 10e3/uL    Absolute Lymphocytes 3.0 0.8 - 5.3 10e3/uL    Absolute Monocytes 0.6 0.0 - 1.3 10e3/uL    Absolute Eosinophils 0.1 0.0 - 0.7 10e3/uL    Absolute Basophils 0.1 0.0 - 0.2 10e3/uL     "Absolute Immature Granulocytes 0.0 <=0.4 10e3/uL    Absolute NRBCs 0.0 10e3/uL   TSH Reflex GH   Result Value Ref Range    TSH 0.95 0.30 - 4.20 uIU/mL   XR Chest Port 1 View    Narrative    XR CHEST PORT 1 VIEW    HISTORY: 44 yearsMale chest pressure    TECHNIQUE: A single view of the chest was performed    COMPARISON: 10/15/2016    FINDINGS: Heart size and pulmonary vascularity are within normal  limits. Lungs are clear. No consolidating airspace opacities are  present.        Impression    IMPRESSION: Clear chest    MOIZ GOULD MD         SYSTEM ID:  [id number]   Troponin T, High Sensitivity   Result Value Ref Range    Troponin T, High Sensitivity <6 <=22 ng/L       Medications   aspirin (ASA) chewable tablet 324 mg (324 mg Oral $Given 9/25/24 0866)       Assessments & Plan (with Medical Decision Making)  Rufino Fragoso is a 44 year old male with palpitations, fatigue, and chest pressure. Hx palpitations for the past 1.5 years. He reports full work up with cardiology in the past. Unclear what is causing his palpitations. Over the past 2 weeks he has been feeling the palpitations more and feeling fatigued. Today at approximately 0520 he developed chest pressure to the left side of his chest and a \"feeling\" in his left arm pit. Today he was at the clinic for a physical and mentioned the palpitations. He was sent over from the clinic. He would not rate the chest pressure. He describes it more as of a sensation in his chest. He does use caffeine daily. He will usually have 1 small cup prior to work and then drinks 2 more cups in the morning while at work. He will occasionally have a bubblr drink during the week.   VS in the ED. /75   Pulse 84   Temp 97.8  F (36.6  C) (Tympanic)   Resp 14   Ht 1.727 m (5' 8\")   Wt 76.2 kg (168 lb)   SpO2 95%   BMI 25.54 kg/m    Diagnostics:    Lab: CBC- normal.  CMP- normal. Troponin- normal. Repeat Troponin- normal. BNP- normal. TSH- normal. D-dimer- normal. PTT- " "normal. INR- normal.     X-ray: CXR- Clear chest     EKG  Rhythm- sinus rhythm with frequent PVC's  Rate- 82  Axis- normal.   Any abnormal   Previous EKG with frequent PVC's.   I have independently reviewed and interpreted today's EKG, pending cardiologist over read.      ED Course as of 09/26/24 0157   Wed Sep 25, 2024   1647 Troponin T, High Sensitivity: 8   1648 D-Dimer Quantitative: <=0.27   1741 The sensation in his chest has resolved. Awaiting repeat troponin. Will also chest a TSH level.    1831 TSH: 0.95     Patient Vitals for the past 24 hrs:   BP Temp Temp src Pulse Resp SpO2 Height Weight   09/25/24 1930 116/75 -- -- 84 14 95 % -- --   09/25/24 1900 124/77 -- -- 80 19 96 % -- --   09/25/24 1830 133/86 -- -- 72 11 98 % -- --   09/25/24 1815 126/79 -- -- 75 -- 96 % -- --   09/25/24 1800 139/85 -- -- 71 -- 98 % -- --   09/25/24 1609 (!) 145/97 97.8  F (36.6  C) Tympanic 78 16 96 % 1.727 m (5' 8\") 76.2 kg (168 lb)     Rufino is a 43 y/o male evaluated today for palpitations and chest pain concerns. Hx palpitations with a previous negative cardiology work-up see copied and pasted above. Cardiology had recommended lifestyle changes and low dose beta blocker. Pt declined beta blocker and chose to make lifestyle changes. He continues to drink a moderate amount of caffeine daily. Gave 324 aspirin. Labs today are unremarkable. EKG reveals frequent PVC's, with bigeminy. Hx bigeminy in the past. His symptoms resolved s/p aspirin. Recommended starting low dose beta blocker (Toprol XL 25 mg daily). He is declining medication. He would like to make lifestyle changes including cutting out caffeine. I placed a cardiology referral. Close follow up with cardiology. Zio patch placed in the ED. After shared decision making, the patient is comfortable with discharging home. Verbalizes understanding of discharge plan.      I have reviewed the nursing notes.    I have reviewed the findings, diagnosis, plan and need for follow up " with the patient.  Medical Decision Making  The patient's presentation was of moderate complexity (a chronic illness mild to moderate exacerbation, progression, or side effect of treatment).    The patient's evaluation involved:  an assessment requiring an independent historian (see separate area of note for details)  ordering and/or review of 3+ test(s) in this encounter (see separate area of note for details)    The patient's management necessitated moderate risk (prescription drug management including medications given in the ED).    Final diagnoses:   Palpitations   Chest pain, unspecified type     9/25/2024   [facility], APRN CNP  10/02/24 1044

## 2024-09-25 NOTE — ED TRIAGE NOTES
Pt here with wife, pt reports that he was seen at the [facility] and told to come to ER for evaluation, pt has been having palpitations, chest pressure and tiredness, pt was found to have multiple PVC's and bigeminy   Triage Assessment (Adult)       Row Name 09/25/24 9542          Triage Assessment    Airway WDL WDL        Respiratory WDL    Respiratory WDL WDL        Skin Circulation/Temperature WDL    Skin Circulation/Temperature WDL WDL        Cardiac WDL    Cardiac WDL WDL        Peripheral/Neurovascular WDL    Peripheral Neurovascular WDL WDL        Cognitive/Neuro/Behavioral WDL    Cognitive/Neuro/Behavioral WDL WDL

## 2024-09-26 ASSESSMENT — ENCOUNTER SYMPTOMS
NERVOUS/ANXIOUS: 1
FATIGUE: 1
PALPITATIONS: 1

## 2024-09-26 NOTE — DISCHARGE INSTRUCTIONS
Your labs are unremarkable today. Your EKG reveals frequent premature ventricular complexes (PVC). I suspect you are symptomatic from the frequent PVC's. I recommend avoiding caffeine as discussed. As discussed I recommend starting a low dose beta blocker (Metoprolol XL 25mg oral daily).     I placed a cardiology referral through [facility] for the next 1-2 weeks. Please call Monday if you do not hear from [facility]. Please follow up.     Please return to the emergency room if you experience worsening chest pain, difficulty breathing, lightheadedness or dizziness, or any new symptoms.

## 2024-09-26 NOTE — PROGRESS NOTES
Patient arrived (date)9/25/2024 (time)2020 for a 7 day Zio monitor per (provider name) Dwain for Bradycardia (Dx).  Patient's skin was prepped per protocol.  Zio monitor was placed.  Patient verbalized understanding of instructions and plan of care.  Patient was given Zio diary and prepaid .

## 2024-09-28 LAB
ATRIAL RATE - MUSE: 82 BPM
DIASTOLIC BLOOD PRESSURE - MUSE: NORMAL MMHG
INTERPRETATION ECG - MUSE: NORMAL
P AXIS - MUSE: 61 DEGREES
PR INTERVAL - MUSE: 142 MS
QRS DURATION - MUSE: 110 MS
QT - MUSE: 396 MS
QTC - MUSE: 462 MS
R AXIS - MUSE: 67 DEGREES
SYSTOLIC BLOOD PRESSURE - MUSE: NORMAL MMHG
T AXIS - MUSE: 40 DEGREES
VENTRICULAR RATE- MUSE: 82 BPM

## (undated) RX ORDER — ASPIRIN 81 MG/1
TABLET, CHEWABLE ORAL
Status: DISPENSED
Start: 2024-09-25